# Patient Record
Sex: FEMALE | Race: WHITE | URBAN - METROPOLITAN AREA
[De-identification: names, ages, dates, MRNs, and addresses within clinical notes are randomized per-mention and may not be internally consistent; named-entity substitution may affect disease eponyms.]

---

## 2022-03-14 ENCOUNTER — OFFICE VISIT (OUTPATIENT)
Dept: URBAN - METROPOLITAN AREA CLINIC 63 | Facility: CLINIC | Age: 37
End: 2022-03-14

## 2022-05-13 ENCOUNTER — OFFICE VISIT (OUTPATIENT)
Dept: URBAN - METROPOLITAN AREA SURGERY CENTER 4 | Facility: SURGERY CENTER | Age: 37
End: 2022-05-13

## 2022-05-18 LAB — PATHOLOGY (INDENTED REPORT): (no result)

## 2022-05-26 ENCOUNTER — OFFICE VISIT (OUTPATIENT)
Dept: URBAN - METROPOLITAN AREA CLINIC 63 | Facility: CLINIC | Age: 37
End: 2022-05-26

## 2022-06-15 ENCOUNTER — LAB OUTSIDE AN ENCOUNTER (OUTPATIENT)
Dept: URBAN - METROPOLITAN AREA CLINIC 121 | Facility: CLINIC | Age: 37
End: 2022-06-15

## 2022-06-22 LAB
% SATURATION: (no result)
ABSOLUTE BASOPHILS: (no result)
ABSOLUTE EOSINOPHILS: (no result)
ABSOLUTE LYMPHOCYTES: (no result)
ABSOLUTE MONOCYTES: (no result)
ABSOLUTE NEUTROPHILS: (no result)
ALBUMIN/GLOBULIN RATIO: (no result)
ALBUMIN: (no result)
ALKALINE PHOSPHATASE: (no result)
ALT: (no result)
AST: (no result)
BASOPHILS: (no result)
BILIRUBIN, TOTAL: (no result)
BUN/CREATININE RATIO: (no result)
C-REACTIVE PROTEIN: (no result)
CALCIUM: (no result)
CARBON DIOXIDE: (no result)
CHLORIDE: (no result)
CREATININE: (no result)
EGFR AFRICAN AMERIC: (no result)
EGFR NON-AFR. AMERI: (no result)
EOSINOPHILS: (no result)
FERRITIN: (no result)
GLOBULIN: (no result)
GLUCOSE: (no result)
HEMATOCRIT: (no result)
HEMOGLOBIN: (no result)
HEPATITIS B CORE AB TOTAL (REFL): (no result)
HEPATITIS B SURFACE ANTIGEN: (no result)
HEPATITIS C ANTIBODY: (no result)
INDEX: 0.02
IRON BINDING CAPACITY: (no result)
IRON, TOTAL: (no result)
LYMPHOCYTES: (no result)
MCH: (no result)
MCHC: (no result)
MCV: (no result)
MITOGEN-NIL: (no result)
MONOCYTES: (no result)
MPV: (no result)
NEUTROPHILS: (no result)
NIL: (no result)
PLATELET COUNT: (no result)
POTASSIUM: (no result)
PROTEIN, TOTAL: (no result)
QUANTIFERON(R)-TB GOLD PLUS, 1 TUBE: NEGATIVE
RDW: (no result)
RED BLOOD CELL COUNT: (no result)
REFLEX TIQ: (no result)
SODIUM: (no result)
TB1-NIL: (no result)
TB2-NIL: (no result)
UREA NITROGEN (BUN): (no result)
WHITE BLOOD CELL COUNT: (no result)

## 2022-07-07 ENCOUNTER — OFFICE VISIT (OUTPATIENT)
Dept: URBAN - METROPOLITAN AREA CLINIC 63 | Facility: CLINIC | Age: 37
End: 2022-07-07

## 2022-07-07 RX ORDER — PREDNISONE 20 MG/1
2 TABLET TABLET ORAL ONCE A DAY
Status: ACTIVE | COMMUNITY

## 2022-07-07 RX ORDER — DICYCLOMINE HYDROCHLORIDE 20 MG/1
1 TABLET TABLET ORAL THREE TIMES A DAY
Status: ACTIVE | COMMUNITY

## 2022-07-07 RX ORDER — OMEPRAZOLE 40 MG/1
1 CAPSULE 30 MINUTES BEFORE MORNING MEAL CAPSULE, DELAYED RELEASE ORAL ONCE A DAY
Status: ACTIVE | COMMUNITY

## 2022-07-07 NOTE — HPI-TODAY'S VISIT:
36-year-old female with asthma initially presented to the office in March 2022 after having been seen in the ER at [facility] with epigastric pain, low back pain, and intermittent sharp right lower quadrant pain.  CT scan of the abdomen/pelvis was done and showed moderate wall thickening in the distal and terminal ileum possibly representing Crohn's disease versus enteritis.  Her CBC and CMP were unremarkable.  She reported a longstanding history of chronic constipation and would notice small amounts of blood on the toilet tissue which she attributed to hemorrhoids.  She began having diarrhea in early March and was passing 1 to 2 tablespoons of bright red blood in the stool.  She was using NSAIDs 2-3 times a day over the previous 5 to 6 years.  She was advised to avoid NSAID use.  She complained of epigastric discomfort with associated nausea and was prescribed famotidine 40 mg daily and dicyclomine to use as needed.  EGD and colonoscopy were done on May 13, 2022.  Her EGD demonstrated severely erythematous mucosa with erosions in the gastric antrum and in the prepyloric region of the stomach, also at the pylorus.  Several small shallow gastric ulcers were found in the gastric antrum and in the prepyloric region of the stomach.  Gastric biopsies were negative for H. pylori.  Mildly congested mucosa without active bleeding was found in the first portion of the duodenum.  Duodenal biopsy showed no evidence for celiac disease.  Her colonoscopy demonstrated a diffuse area of the terminal ileum with congestion and edema.  The scope was advanced at least 15 cm into the terminal ileum and there was diffuse inflammation.  Multiple shallow ulcers with the endoscopic appearance concerning for Crohn's disease was observed.  The biopsy demonstrated small bowel mucosa with chronic enteritis, polypoid change and ulcer bed material.  The colonic mucosa was normal throughout.  Random right and left colon biopsy showed mild chronic colitis.  No cryptitis was noted.  She was started on omeprazole 40 mg twice daily following her procedures.  She reported that she had completely stopped using NSAIDs.  CT enterography was done on Korina 3, 2022.  This demonstrated mild active inflammation involving portions of the distal and terminal ileum without evidence of complication.  Labs were done on Korina 15, 2022.  Her CBC, CMP and CRP were unremarkable.  Serum iron was normal.  Ferritin was low at 11.  Orders were placed to start Stelara and she is scheduled for her induction infusion.

## 2022-07-09 ENCOUNTER — TELEPHONE ENCOUNTER (OUTPATIENT)
Dept: URBAN - METROPOLITAN AREA CLINIC 121 | Facility: CLINIC | Age: 37
End: 2022-07-09

## 2022-07-09 RX ORDER — DICYCLOMINE HYDROCHLORIDE 20 MG/1
FOUR TIMES A DAY AS NEEDED TABLET ORAL
Refills: 0 | OUTPATIENT
Start: 2022-03-14 | End: 2022-05-13

## 2022-07-09 RX ORDER — FAMOTIDINE 40 MG/1
ONCE A DAY TABLET, FILM COATED ORAL ONCE A DAY
Refills: 2 | OUTPATIENT
Start: 2022-03-14 | End: 2022-05-13

## 2022-07-09 RX ORDER — PREDNISONE 20 MG/1
Q 12 HOURS TABLET ORAL TAKE AS DIRECTED
Refills: 0 | OUTPATIENT
Start: 2022-03-14 | End: 2022-05-26

## 2022-07-09 RX ORDER — DICYCLOMINE HYDROCHLORIDE 20 MG/1
TABLET ORAL ONCE A DAY
Refills: 0 | OUTPATIENT
Start: 2022-03-14 | End: 2022-05-26

## 2022-07-09 RX ORDER — OMEPRAZOLE 40 MG/1
TWICE A DAY CAPSULE, DELAYED RELEASE ORAL TWICE A DAY
Refills: 2 | OUTPATIENT
Start: 2022-05-13 | End: 2022-06-06

## 2022-07-09 RX ORDER — DICYCLOMINE HYDROCHLORIDE 20 MG/1
TAKE 1 TABLET BY MOUTH FOUR TIMES A DAY AS NEEDED TABLET ORAL
Refills: 0 | OUTPATIENT
Start: 2022-05-13 | End: 2022-05-16

## 2022-07-10 ENCOUNTER — TELEPHONE ENCOUNTER (OUTPATIENT)
Dept: URBAN - METROPOLITAN AREA CLINIC 121 | Facility: CLINIC | Age: 37
End: 2022-07-10

## 2022-07-10 RX ORDER — DICYCLOMINE HYDROCHLORIDE 20 MG/1
TAKE 1 TABLET BY MOUTH FOUR TIMES A DAY AS NEEDED TABLET ORAL
Refills: 1 | Status: ACTIVE | COMMUNITY
Start: 2022-05-16

## 2022-07-10 RX ORDER — PREDNISONE 20 MG/1
Q 12 HOURS TABLET ORAL TAKE AS DIRECTED
Refills: 0 | Status: ACTIVE | COMMUNITY
Start: 2022-05-26

## 2022-07-10 RX ORDER — FAMOTIDINE 40 MG/1
TAKE 1 TABLET BY MOUTH EVERY DAY TABLET, FILM COATED ORAL
Refills: 2 | Status: ACTIVE | COMMUNITY
Start: 2022-05-13

## 2022-07-10 RX ORDER — POLYETHYLENE GLYCOL-3350 AND ELECTROLYTES WITH FLAVOR PACK 240; 5.84; 2.98; 6.72; 22.72 G/278.26G; G/278.26G; G/278.26G; G/278.26G; G/278.26G
TAKE AS DIRECTED POWDER, FOR SOLUTION ORAL TAKE AS DIRECTED
Refills: 0 | Status: ACTIVE | COMMUNITY
Start: 2022-03-14

## 2022-07-10 RX ORDER — OMEPRAZOLE 40 MG/1
TWICE A DAY CAPSULE, DELAYED RELEASE ORAL TWICE A DAY
Refills: 2 | Status: ACTIVE | COMMUNITY
Start: 2022-06-06

## 2022-07-28 ENCOUNTER — OFFICE VISIT (OUTPATIENT)
Dept: URBAN - METROPOLITAN AREA CLINIC 63 | Facility: CLINIC | Age: 37
End: 2022-07-28
Payer: COMMERCIAL

## 2022-07-28 ENCOUNTER — TELEPHONE ENCOUNTER (OUTPATIENT)
Dept: URBAN - METROPOLITAN AREA CLINIC 63 | Facility: CLINIC | Age: 37
End: 2022-07-28

## 2022-07-28 ENCOUNTER — OFFICE VISIT (OUTPATIENT)
Dept: URBAN - METROPOLITAN AREA CLINIC 63 | Facility: CLINIC | Age: 37
End: 2022-07-28

## 2022-07-28 ENCOUNTER — WEB ENCOUNTER (OUTPATIENT)
Dept: URBAN - METROPOLITAN AREA CLINIC 63 | Facility: CLINIC | Age: 37
End: 2022-07-28

## 2022-07-28 VITALS
HEIGHT: 62 IN | DIASTOLIC BLOOD PRESSURE: 79 MMHG | TEMPERATURE: 98.5 F | SYSTOLIC BLOOD PRESSURE: 123 MMHG | BODY MASS INDEX: 38.68 KG/M2 | OXYGEN SATURATION: 99 % | WEIGHT: 210.2 LBS | HEART RATE: 99 BPM

## 2022-07-28 DIAGNOSIS — K50.00 CROHN'S DISEASE OF SMALL INTESTINE WITHOUT COMPLICATION: ICD-10-CM

## 2022-07-28 DIAGNOSIS — K27.9 PUD (PEPTIC ULCER DISEASE): ICD-10-CM

## 2022-07-28 PROBLEM — 13200003: Status: ACTIVE | Noted: 2022-07-28

## 2022-07-28 PROCEDURE — 99213 OFFICE O/P EST LOW 20 MIN: CPT | Performed by: PHYSICIAN ASSISTANT

## 2022-07-28 RX ORDER — POLYETHYLENE GLYCOL-3350 AND ELECTROLYTES WITH FLAVOR PACK 240; 5.84; 2.98; 6.72; 22.72 G/278.26G; G/278.26G; G/278.26G; G/278.26G; G/278.26G
TAKE AS DIRECTED POWDER, FOR SOLUTION ORAL TAKE AS DIRECTED
Refills: 0 | Status: ACTIVE | COMMUNITY
Start: 2022-03-14

## 2022-07-28 RX ORDER — DICYCLOMINE HYDROCHLORIDE 20 MG/1
1 TABLET TABLET ORAL THREE TIMES A DAY
Status: ACTIVE | COMMUNITY

## 2022-07-28 RX ORDER — OMEPRAZOLE 40 MG/1
TWICE A DAY CAPSULE, DELAYED RELEASE ORAL TWICE A DAY
Refills: 2 | Status: ACTIVE | COMMUNITY
Start: 2022-06-06

## 2022-07-28 RX ORDER — FAMOTIDINE 40 MG/1
TAKE 1 TABLET BY MOUTH EVERY DAY TABLET, FILM COATED ORAL
Refills: 2 | Status: ACTIVE | COMMUNITY
Start: 2022-05-13

## 2022-07-28 RX ORDER — PREDNISONE 20 MG/1
Q 12 HOURS TABLET ORAL TAKE AS DIRECTED
Refills: 0 | Status: ACTIVE | COMMUNITY
Start: 2022-05-26

## 2022-07-28 RX ORDER — PREDNISONE 20 MG/1
2 TABLET TABLET ORAL ONCE A DAY
Status: ACTIVE | COMMUNITY

## 2022-07-28 RX ORDER — DICYCLOMINE HYDROCHLORIDE 20 MG/1
TAKE 1 TABLET BY MOUTH FOUR TIMES A DAY AS NEEDED TABLET ORAL
Refills: 1 | Status: ACTIVE | COMMUNITY
Start: 2022-05-16

## 2022-07-28 RX ORDER — OMEPRAZOLE 40 MG/1
1 CAPSULE 30 MINUTES BEFORE MORNING MEAL CAPSULE, DELAYED RELEASE ORAL ONCE A DAY
Status: ACTIVE | COMMUNITY

## 2022-07-28 NOTE — HPI-TODAY'S VISIT:
36-year-old female was initially seen in the office in March 2022 after having been seen in the ER at [facility] with abdominal pain.  CT scan of the abdomen and pelvis was done which showed moderate wall thickening in the distal and terminal ileum possibly representing Crohn's versus enteritis.  Her CBC and CMP were unremarkable. At her initial office visit in March she reported chronic intermittent upper abdominal pain and nausea.  She reported a longstanding history of chronic constipation and would notice small amounts of blood on the toilet tissue which she attributed to hemorrhoids.  Before she was seen in the ER she began to have diarrhea and was passing 1 to 2 tablespoons of bright red blood in the stool.  She also reported that she had been using NSAIDs 2-3 times a day every day for the past 5 or 6 years. She was prescribed famotidine and dicyclomine.  She was advised to stop using NSAIDs.  EGD and colonoscopy were done on May 13, 2022.  Her EGD demonstrated severely erythematous mucosa with erosions in the gastric antrum, prepyloric region of the stomach and at the pylorus.  Several small shallow gastric ulcers were found in the gastric antrum and in the prepyloric region of the stomach.  Gastric biopsies were negative for H. pylori.  She was found to have mild duodenitis in the first portion of the duodenum.  The second portion of the duodenum was normal and the biopsy showed no evidence for celiac disease.  Her colonoscopy demonstrated a diffuse area of the terminal ileum with congestion and edema.  The scope was advanced at least 15 cm into the terminal ileum and there was diffuse inflammation.  The terminal ileum contained multiple shallow ulcers with endoscopic appearance concerning for Crohn's disease.  The biopsy of the terminal ileum showed small bowel mucosa with chronic enteritis, polypoid change in ulcer bed material.  The colonic mucosa appeared normal throughout.  Random right and left colon biopsy showed mild chronic colitis.  Grade 1 internal hemorrhoids were observed. Following EGD her famotidine was changed to omeprazole 40 mg twice daily.  She discontinued using NSAIDs.  She was last seen in the office on May 26 still reporting diarrhea though she had not noticed any further blood in the stool.  Stool studies were ordered at her office visit in March which she did not complete.  Her stool studies were reordered at her office visit in May but she has not completed them.  She was sent for a CT enterography which was done on Korina 3, 2022 and showed mild active inflammation involving portions of the distal and terminal ileum without evidence of complication.  She follows up today after receiving her induction infusion of Stelara this morning. She is having diarrhea 4-5 times per day. She states she completed her stool studies last month but the results have not been received by our office. She has abdominal cramping in the mid abdomen several times per day. She is using APAP as needed. She also uses a PMS pain reliever during her menses. She continues to use omeprazole 40mg BID and famotidine once daily. She is eating well.  She has used dicyclomine in the past but does not like to use it because it causes drowsiness.

## 2022-08-09 ENCOUNTER — ERX REFILL RESPONSE (OUTPATIENT)
Dept: URBAN - METROPOLITAN AREA CLINIC 63 | Facility: CLINIC | Age: 37
End: 2022-08-09

## 2022-08-09 ENCOUNTER — TELEPHONE ENCOUNTER (OUTPATIENT)
Dept: URBAN - METROPOLITAN AREA CLINIC 63 | Facility: CLINIC | Age: 37
End: 2022-08-09

## 2022-08-09 RX ORDER — FAMOTIDINE 40 MG/1
TAKE 1 TABLET BY MOUTH EVERY DAY TABLET, FILM COATED ORAL
Refills: 2 | OUTPATIENT

## 2022-08-09 RX ORDER — FAMOTIDINE 40 MG/1
TAKE 1 TABLET BY MOUTH EVERY DAY TABLET, FILM COATED ORAL
Qty: 90 TABLET | Refills: 0 | OUTPATIENT

## 2022-08-11 ENCOUNTER — TELEPHONE ENCOUNTER (OUTPATIENT)
Dept: URBAN - METROPOLITAN AREA CLINIC 63 | Facility: CLINIC | Age: 37
End: 2022-08-11

## 2022-08-11 PROBLEM — 34000006: Status: ACTIVE | Noted: 2022-08-11

## 2022-08-11 RX ORDER — USTEKINUMAB 90 MG/ML
AS DIRECTED INJECTION, SOLUTION SUBCUTANEOUS
Qty: 1 | Refills: 8 | OUTPATIENT
Start: 2022-08-11 | End: 2023-12-27

## 2022-08-22 LAB
A/G RATIO: 1.6
ALBUMIN: 4.2
ALKALINE PHOSPHATASE: 56
ALT (SGPT): 11
AST (SGOT): 11
BILIRUBIN, TOTAL: 0.4
BUN/CREATININE RATIO: (no result)
BUN: 12
C-REACTIVE PROTEIN, QUANT: 2.3
CALCIUM: 9.3
CARBON DIOXIDE, TOTAL: 25
CHLORIDE: 108
CREATININE: 0.65
EGFR: 117
GLOBULIN, TOTAL: 2.6
GLUCOSE: 93
HEMATOCRIT: 39.6
HEMOGLOBIN: 13.6
MCH: 31.6
MCHC: 34.3
MCV: 91.9
MPV: 8.7
PLATELET COUNT: 369
POTASSIUM: 4.7
PROTEIN, TOTAL: 6.8
RDW: 12.6
RED BLOOD CELL COUNT: 4.31
SODIUM: 140
WHITE BLOOD CELL COUNT: 6.9

## 2022-08-25 ENCOUNTER — TELEPHONE ENCOUNTER (OUTPATIENT)
Dept: URBAN - METROPOLITAN AREA CLINIC 63 | Facility: CLINIC | Age: 37
End: 2022-08-25

## 2022-08-25 RX ORDER — USTEKINUMAB 90 MG/ML
AS DIRECTED INJECTION, SOLUTION SUBCUTANEOUS
Qty: 1 | Refills: 8 | OUTPATIENT
Start: 2022-08-25 | End: 2024-01-10

## 2022-09-14 ENCOUNTER — LAB OUTSIDE AN ENCOUNTER (OUTPATIENT)
Dept: URBAN - METROPOLITAN AREA CLINIC 63 | Facility: CLINIC | Age: 37
End: 2022-09-14

## 2022-10-03 ENCOUNTER — OFFICE VISIT (OUTPATIENT)
Dept: URBAN - METROPOLITAN AREA CLINIC 63 | Facility: CLINIC | Age: 37
End: 2022-10-03

## 2022-11-08 ENCOUNTER — OFFICE VISIT (OUTPATIENT)
Dept: URBAN - METROPOLITAN AREA CLINIC 63 | Facility: CLINIC | Age: 37
End: 2022-11-08
Payer: COMMERCIAL

## 2022-11-08 ENCOUNTER — WEB ENCOUNTER (OUTPATIENT)
Dept: URBAN - METROPOLITAN AREA CLINIC 63 | Facility: CLINIC | Age: 37
End: 2022-11-08

## 2022-11-08 ENCOUNTER — LAB OUTSIDE AN ENCOUNTER (OUTPATIENT)
Dept: URBAN - METROPOLITAN AREA CLINIC 63 | Facility: CLINIC | Age: 37
End: 2022-11-08

## 2022-11-08 VITALS
SYSTOLIC BLOOD PRESSURE: 120 MMHG | HEART RATE: 80 BPM | BODY MASS INDEX: 36.62 KG/M2 | TEMPERATURE: 98.1 F | DIASTOLIC BLOOD PRESSURE: 80 MMHG | WEIGHT: 199 LBS | OXYGEN SATURATION: 99 % | HEIGHT: 62 IN

## 2022-11-08 DIAGNOSIS — K50.00 CROHN'S DISEASE OF SMALL INTESTINE WITHOUT COMPLICATION: ICD-10-CM

## 2022-11-08 DIAGNOSIS — K59.00 CONSTIPATION, UNSPECIFIED CONSTIPATION TYPE: ICD-10-CM

## 2022-11-08 PROCEDURE — 99213 OFFICE O/P EST LOW 20 MIN: CPT | Performed by: PHYSICIAN ASSISTANT

## 2022-11-08 RX ORDER — USTEKINUMAB 90 MG/ML
AS DIRECTED INJECTION, SOLUTION SUBCUTANEOUS
Qty: 1 | Refills: 8 | Status: ACTIVE | COMMUNITY
Start: 2022-08-25 | End: 2024-01-10

## 2022-11-08 NOTE — HPI-TODAY'S VISIT:
36-year-old female with Crohn's disease presents for follow-up.  She received her  induction dose of Stelara in July and her first maintenance dose on September 22, 2022.  She is due for her next maintenance dose next week. She was initially seen in the office in March 2022 after having been seen in the ER at [facility] with abdominal pain.  CT scan of the abdomen and pelvis was done which showed moderate wall thickening in the distal and terminal ileum possibly resenting Crohn's disease versus enteritis.  Her CBC and CMP were unremarkable.  At her initial office visit in March, she reported chronic intermittent upper abdominal pain and nausea.  She reported a longstanding history of chronic constipation and would notice small amounts of blood on the toilet tissue.  Before she was seen in the ER she began to have diarrhea and was passing 1 to 2 tablespoons of bright red blood in the stool.  She had also reported that she had been using NSAIDs 2-3 times per day, every day for 5 or 6 years.  She was advised to stop using NSAIDs.  She was prescribed famotidine and dicyclomine.  EGD and colonoscopy were done on May 13, 2022.  Her EGD demonstrated severely erythematous mucosa with erosions in the gastric antrum, prepyloric region of the stomach and at the pylorus.  Several small shallow gastric ulcers were found in the gastric antrum and in the prepyloric region of the stomach.  Gastric biopsies were negative for H. pylori.  She was found to have mild duodenitis in the first portion of the duodenum.  The second portion of the duodenum was normal and the biopsy showed no evidence for celiac disease.  Her colonoscopy demonstrated a diffuse area of terminal ileum with congestion and edema.  The scope was advanced at least 15 cm into the terminal ileum and there was diffuse inflammation.  The terminal ileum contained multiple shallow ulcers with endoscopic appearance concerning for Crohn's disease.  Biopsy of the terminal ileum showed small bowel mucosa with chronic enteritis, polypoid change in ulcer bed material.  The colonic mucosa appeared normal throughout.  Random right and left colon biopsies showed mild chronic colitis.  Grade 1 internal hemorrhoids were observed. Following her EGD she was started on omeprazole 40 mg twice daily.  She had discontinued using NSAIDs.  CT enterography was done on Korina 3, 2022 and showed mild active inflammation involving portions of the distal and terminal ileum without evidence of complication. She was last seen in the office in July after her induction dose of Stelara which had just been given earlier that morning.  She reported having diarrhea 4-5 times per day.  She reported abdominal cramping which would occur several times a day.  She denied any NSAID use.  She had used dicyclomine in the past but it causes drowsiness so she prefers not to use this. Labs were done on August 19, 2022.  Her CBC, CMP and CRP were normal.  She follows up today reporting intermittent abdominal pain. States she has body aches and sweats but no fever. She feels dehydrated. She states her bowel habits have been alternating between constipation and normal stools. She has not had a BM in one week. She had diarrhea a couple weeks ago but she is not having frequent diarrhea. She states she has occasional "tinge" of blood in the stools. States she felt nauseous after her last injection of Stelara.

## 2022-11-22 ENCOUNTER — CLAIMS CREATED FROM THE CLAIM WINDOW (OUTPATIENT)
Dept: URBAN - METROPOLITAN AREA SURGERY CENTER 4 | Facility: SURGERY CENTER | Age: 37
End: 2022-11-22
Payer: COMMERCIAL

## 2022-11-22 ENCOUNTER — CLAIMS CREATED FROM THE CLAIM WINDOW (OUTPATIENT)
Dept: URBAN - METROPOLITAN AREA CLINIC 4 | Facility: CLINIC | Age: 37
End: 2022-11-22
Payer: COMMERCIAL

## 2022-11-22 DIAGNOSIS — D12.0 POLYP OF CECUM: ICD-10-CM

## 2022-11-22 DIAGNOSIS — K64.0 GRADE I INTERNAL HEMORRHOIDS: ICD-10-CM

## 2022-11-22 DIAGNOSIS — K52.9 ILEITIS: ICD-10-CM

## 2022-11-22 DIAGNOSIS — K59.00 CONSTIPATION, UNSPECIFIED CONSTIPATION TYPE: ICD-10-CM

## 2022-11-22 DIAGNOSIS — K50.00 CROHN'S DISEASE OF SMALL INTESTINE WITHOUT COMPLICATIONS: ICD-10-CM

## 2022-11-22 PROCEDURE — 45380 COLONOSCOPY AND BIOPSY: CPT | Performed by: INTERNAL MEDICINE

## 2022-11-22 PROCEDURE — 45385 COLONOSCOPY W/LESION REMOVAL: CPT | Performed by: INTERNAL MEDICINE

## 2022-11-22 PROCEDURE — 88305 TISSUE EXAM BY PATHOLOGIST: CPT | Performed by: PATHOLOGY

## 2022-11-22 RX ORDER — USTEKINUMAB 90 MG/ML
AS DIRECTED INJECTION, SOLUTION SUBCUTANEOUS
Qty: 1 | Refills: 8 | Status: ACTIVE | COMMUNITY
Start: 2022-08-25 | End: 2024-01-10

## 2022-11-30 PROBLEM — 14760008: Status: ACTIVE | Noted: 2022-11-08

## 2023-01-03 ENCOUNTER — TELEPHONE ENCOUNTER (OUTPATIENT)
Dept: URBAN - METROPOLITAN AREA CLINIC 63 | Facility: CLINIC | Age: 38
End: 2023-01-03

## 2023-01-03 RX ORDER — FAMOTIDINE 40 MG/1
TAKE 1 TABLET BY MOUTH EVERY DAY TABLET, FILM COATED ORAL
Qty: 90 TABLET | Refills: 0

## 2023-01-05 ENCOUNTER — OFFICE VISIT (OUTPATIENT)
Dept: URBAN - METROPOLITAN AREA CLINIC 63 | Facility: CLINIC | Age: 38
End: 2023-01-05
Payer: COMMERCIAL

## 2023-01-05 VITALS
WEIGHT: 193 LBS | HEIGHT: 62 IN | BODY MASS INDEX: 35.51 KG/M2 | TEMPERATURE: 98.6 F | DIASTOLIC BLOOD PRESSURE: 68 MMHG | SYSTOLIC BLOOD PRESSURE: 116 MMHG | OXYGEN SATURATION: 98 % | HEART RATE: 77 BPM

## 2023-01-05 DIAGNOSIS — K50.00 CROHN'S DISEASE OF SMALL INTESTINE WITHOUT COMPLICATION: ICD-10-CM

## 2023-01-05 PROBLEM — 56689002: Status: ACTIVE | Noted: 2022-07-07

## 2023-01-05 PROCEDURE — 99214 OFFICE O/P EST MOD 30 MIN: CPT | Performed by: PHYSICIAN ASSISTANT

## 2023-01-05 RX ORDER — USTEKINUMAB 90 MG/ML
AS DIRECTED INJECTION, SOLUTION SUBCUTANEOUS
Qty: 1 | Refills: 8 | Status: ACTIVE | COMMUNITY
Start: 2022-08-25 | End: 2024-01-10

## 2023-01-05 RX ORDER — FAMOTIDINE 40 MG/1
TAKE 1 TABLET BY MOUTH EVERY DAY TABLET, FILM COATED ORAL
Qty: 90 TABLET | Refills: 0 | Status: ACTIVE | COMMUNITY

## 2023-01-05 NOTE — HPI-TODAY'S VISIT:
37-year-old female with history of Crohn's disease diagnosed on colonoscopy in May 2022 currently on Stelara presents for follow-up.  She has a history of PUD on EGD in May secondary to chronic NSAID use.  She was advised to avoid NSAID pain relievers.  She was treated with PPI twice daily.  She received her first induction dose of Stelara in July 2022.  Labs done August 9, 2022 including CBC, CMP and CRP were normal.  CT enterography in June 2022 showed mild active inflammation involving portions of the distal and terminal ileum without evidence of complication. She reported chronic intermittent abdominal pain.  She reported having body aches and sweats but no fever.  Bowel habits alternate between constipation and normal stools.  She reported having occasional diarrhea though this was not frequent.  She reported seeing an occasional tiny amount of blood in the stool. She follows up today after colonoscopy on November 22, 2022 which was done to evaluate her response to Stelara.  Her colonoscopy showed improvement with mild to moderate inflammation in the terminal ileum.  This was improved from her prior colonoscopy in May.  The ulcerations appeared to be healed and improved.  Biopsy of the terminal ileum showed chronic active ileitis with aphthous ulcer and pyloric gland metaplasia consistent with Crohn's disease.  The colonic mucosa appeared normal throughout.  A 5 mm inflammatory pseudopolyp was removed from the cecum.  Small internal hemorrhoids were observed.  She follows up doing well. She has alternating constipation and diarrhea. States at this point, she usually has one BM per day. Not having any frequent diarrhea.  She has chronic abdominal discomfort and uses dicyclomine as needed. She has no blood in the stool. No other complaints.

## 2023-03-02 ENCOUNTER — TELEPHONE ENCOUNTER (OUTPATIENT)
Dept: URBAN - METROPOLITAN AREA CLINIC 63 | Facility: CLINIC | Age: 38
End: 2023-03-02

## 2023-03-07 ENCOUNTER — WEB ENCOUNTER (OUTPATIENT)
Dept: URBAN - METROPOLITAN AREA CLINIC 63 | Facility: CLINIC | Age: 38
End: 2023-03-07

## 2023-03-08 LAB
A/G RATIO: 1.8
ALBUMIN: 3.9
ALKALINE PHOSPHATASE: 50
ALT (SGPT): 15
AST (SGOT): 16
BILIRUBIN, TOTAL: 0.3
BUN/CREATININE RATIO: (no result)
BUN: 9
C-REACTIVE PROTEIN, QUANT: 1.8
CALCIUM: 9
CARBON DIOXIDE, TOTAL: 24
CHLORIDE: 109
CREATININE: 0.58
EGFR: 119
GLOBULIN, TOTAL: 2.2
GLUCOSE: 84
HEMATOCRIT: 41
HEMOGLOBIN: 14.1
MCH: 32.6
MCHC: 34.4
MCV: 94.7
MPV: 8.8
PLATELET COUNT: 327
POTASSIUM: 4.6
PROTEIN, TOTAL: 6.1
RDW: 12.2
RED BLOOD CELL COUNT: 4.33
SODIUM: 140
WHITE BLOOD CELL COUNT: 6.5

## 2023-03-13 ENCOUNTER — OFFICE VISIT (OUTPATIENT)
Dept: URBAN - METROPOLITAN AREA CLINIC 63 | Facility: CLINIC | Age: 38
End: 2023-03-13
Payer: COMMERCIAL

## 2023-03-13 ENCOUNTER — LAB OUTSIDE AN ENCOUNTER (OUTPATIENT)
Dept: URBAN - METROPOLITAN AREA CLINIC 63 | Facility: CLINIC | Age: 38
End: 2023-03-13

## 2023-03-13 ENCOUNTER — WEB ENCOUNTER (OUTPATIENT)
Dept: URBAN - METROPOLITAN AREA CLINIC 63 | Facility: CLINIC | Age: 38
End: 2023-03-13

## 2023-03-13 VITALS
OXYGEN SATURATION: 98 % | DIASTOLIC BLOOD PRESSURE: 78 MMHG | HEIGHT: 62 IN | WEIGHT: 199 LBS | TEMPERATURE: 97.8 F | SYSTOLIC BLOOD PRESSURE: 120 MMHG | BODY MASS INDEX: 36.62 KG/M2 | HEART RATE: 82 BPM

## 2023-03-13 DIAGNOSIS — R10.30 LOWER ABDOMINAL PAIN: ICD-10-CM

## 2023-03-13 DIAGNOSIS — R10.13 EPIGASTRIC PAIN: ICD-10-CM

## 2023-03-13 DIAGNOSIS — R19.7 DIARRHEA, UNSPECIFIED TYPE: ICD-10-CM

## 2023-03-13 PROCEDURE — 99214 OFFICE O/P EST MOD 30 MIN: CPT | Performed by: PHYSICIAN ASSISTANT

## 2023-03-13 RX ORDER — DICYCLOMINE HYDROCHLORIDE 20 MG/1
TAKE 1 TABLET BY MOUTH FOUR TIMES A DAY AS NEEDED TABLET ORAL THREE TIMES A DAY
Qty: 90 | Refills: 3
End: 2023-06-11

## 2023-03-13 RX ORDER — DICYCLOMINE HYDROCHLORIDE 20 MG/1
TAKE 1 TABLET BY MOUTH FOUR TIMES A DAY AS NEEDED TABLET ORAL
Qty: 360 EACH | Refills: 0 | Status: ACTIVE | COMMUNITY

## 2023-03-13 RX ORDER — FAMOTIDINE 40 MG/1
TAKE 1 TABLET BY MOUTH EVERY DAY TABLET, FILM COATED ORAL
Qty: 90 TABLET | Refills: 0 | Status: ACTIVE | COMMUNITY

## 2023-03-13 RX ORDER — USTEKINUMAB 90 MG/ML
INJECTION, SOLUTION SUBCUTANEOUS
Qty: 1 | Refills: 0 | Status: ACTIVE | COMMUNITY

## 2023-03-13 RX ORDER — OMEPRAZOLE 40 MG/1
TAKE 1 CAPSULE BY MOUTH TWICE A DAY CAPSULE, DELAYED RELEASE ORAL ONCE A DAY
Qty: 30 | Refills: 3

## 2023-03-13 RX ORDER — OMEPRAZOLE 40 MG/1
TAKE 1 CAPSULE BY MOUTH TWICE A DAY CAPSULE, DELAYED RELEASE ORAL
Qty: 60 EACH | Refills: 0 | Status: ACTIVE | COMMUNITY

## 2023-03-13 NOTE — HPI-TODAY'S VISIT:
37-year-old female with history of Crohn's disease diagnosed on colonoscopy in May 2022 currently on Stelara 90 mg subcu every 8 weeks presents for follow-up.  She has a history of PUD on EGD 2020 secondary to chronic NSAID use and she was advised to avoid NSAID pain relievers.  She was treated with high-dose PPI.  She received her first induction dose of Stelara in July 2022.  Her labs done in August 2022 including CBC, CMP and CRP were normal.  CT enterography in June 2022 showed mild active inflammation involving portions of the distal and terminal ileum without evidence of complication.  She continues to report chronic intermittent abdominal pain she reported having body aches and sweats without fever.  Her bowel habits usually alternate between constipation and diarrhea the diarrhea does not occur frequently.  She has occasionally seen a tiny amount of blood in the stool.  Repeat colonoscopy was done November 22, 2022 to evaluate her response to Stelara.  Her colonoscopy showed improvement with mild to moderate inflammation in the terminal ileum.  This was improved from her prior colonoscopy in May 2022.  The ulcerations appear to be healed and improved.  Biopsy of the terminal ileum showed chronic active ileitis with aphthous ulcer and pyloric gland metaplasia consistent with Crohn's disease.  The colonic mucosa appeared normal throughout.  A 5 mm inflammatory pseudopolyp was removed from the cecum.  Small internal hemorrhoids were observed.  She was last seen in the office in January 2023.  She continues to report alternating bowel habits between constipation and diarrhea usually she was having 1 bowel movement per day and was not having any frequent diarrhea.  She reported chronic abdominal discomfort and was using dicyclomine as needed.  She denied any blood in the stool.  She was advised to follow-up in 6 months after repeat labs.  She phoned into the office earlier this month reporting with mucus and dark stools.  She also reported abdominal cramping.  Labs were done including CBC, CMP, and CRP on March 7, 2023 which were all normal.  She presents today still having stools with mucus and black specks. She has a lot of fatigue. She feels hot and sweaty while she is at work. She works outside..She has pain in the epigastric pain over the past couple of weeks. She has had 3 episodes of diarrhea per day over the past 2 weeks. She was given antibiotics in Dec for a URI. No rectal bleeding. She is not using NSAIDs.

## 2023-03-14 ENCOUNTER — TELEPHONE ENCOUNTER (OUTPATIENT)
Dept: URBAN - METROPOLITAN AREA CLINIC 61 | Facility: CLINIC | Age: 38
End: 2023-03-14

## 2023-04-03 LAB
CALPROTECTIN, FECAL: 127
CAMPYLOBACTER SPP. AG,EIA: (no result)
CLOSTRIDIUM DIFFICILE: (no result)
SALMONELLA AND SHIGELLA, CULTURE: (no result)
SHIGA TOXINS, EIA W/RFL TO E.COLI O157 CULTURE: (no result)

## 2023-04-04 ENCOUNTER — CLAIMS CREATED FROM THE CLAIM WINDOW (OUTPATIENT)
Dept: URBAN - METROPOLITAN AREA SURGERY CENTER 4 | Facility: SURGERY CENTER | Age: 38
End: 2023-04-04
Payer: COMMERCIAL

## 2023-04-04 ENCOUNTER — CLAIMS CREATED FROM THE CLAIM WINDOW (OUTPATIENT)
Dept: URBAN - METROPOLITAN AREA CLINIC 4 | Facility: CLINIC | Age: 38
End: 2023-04-04
Payer: COMMERCIAL

## 2023-04-04 DIAGNOSIS — K44.9 HIATAL HERNIA: ICD-10-CM

## 2023-04-04 DIAGNOSIS — K25.9 GASTRIC ULCER: ICD-10-CM

## 2023-04-04 DIAGNOSIS — K29.70 GASTRITIS: ICD-10-CM

## 2023-04-04 DIAGNOSIS — K29.70 GASTRITIS, UNSPECIFIED, WITHOUT BLEEDING: ICD-10-CM

## 2023-04-04 PROCEDURE — 88305 TISSUE EXAM BY PATHOLOGIST: CPT | Performed by: PATHOLOGY

## 2023-04-04 PROCEDURE — 43239 EGD BIOPSY SINGLE/MULTIPLE: CPT | Performed by: INTERNAL MEDICINE

## 2023-04-04 PROCEDURE — 88312 SPECIAL STAINS GROUP 1: CPT | Performed by: PATHOLOGY

## 2023-04-04 RX ORDER — FAMOTIDINE 40 MG/1
TAKE 1 TABLET BY MOUTH EVERY DAY TABLET, FILM COATED ORAL
Qty: 90 TABLET | Refills: 0 | Status: ACTIVE | COMMUNITY

## 2023-04-04 RX ORDER — DICYCLOMINE HYDROCHLORIDE 20 MG/1
TAKE 1 TABLET BY MOUTH FOUR TIMES A DAY AS NEEDED TABLET ORAL THREE TIMES A DAY
Qty: 90 | Refills: 3 | Status: ACTIVE | COMMUNITY
End: 2023-06-11

## 2023-04-04 RX ORDER — USTEKINUMAB 90 MG/ML
INJECTION, SOLUTION SUBCUTANEOUS
Qty: 1 | Refills: 0 | Status: ACTIVE | COMMUNITY

## 2023-04-04 RX ORDER — OMEPRAZOLE 40 MG/1
TAKE 1 CAPSULE BY MOUTH TWICE A DAY CAPSULE, DELAYED RELEASE ORAL ONCE A DAY
Qty: 30 | Refills: 3 | Status: ACTIVE | COMMUNITY

## 2023-05-02 ENCOUNTER — WEB ENCOUNTER (OUTPATIENT)
Dept: URBAN - METROPOLITAN AREA CLINIC 63 | Facility: CLINIC | Age: 38
End: 2023-05-02

## 2023-05-02 ENCOUNTER — TELEPHONE ENCOUNTER (OUTPATIENT)
Dept: URBAN - METROPOLITAN AREA CLINIC 64 | Facility: CLINIC | Age: 38
End: 2023-05-02

## 2023-05-02 ENCOUNTER — OFFICE VISIT (OUTPATIENT)
Dept: URBAN - METROPOLITAN AREA CLINIC 63 | Facility: CLINIC | Age: 38
End: 2023-05-02
Payer: COMMERCIAL

## 2023-05-02 VITALS
WEIGHT: 209.2 LBS | HEIGHT: 62 IN | DIASTOLIC BLOOD PRESSURE: 82 MMHG | HEART RATE: 68 BPM | TEMPERATURE: 98.1 F | BODY MASS INDEX: 38.5 KG/M2 | SYSTOLIC BLOOD PRESSURE: 124 MMHG | OXYGEN SATURATION: 98 %

## 2023-05-02 DIAGNOSIS — K50.00 CROHN'S DISEASE OF SMALL INTESTINE WITHOUT COMPLICATION: ICD-10-CM

## 2023-05-02 DIAGNOSIS — R10.13 EPIGASTRIC PAIN: ICD-10-CM

## 2023-05-02 PROCEDURE — 99214 OFFICE O/P EST MOD 30 MIN: CPT | Performed by: PHYSICIAN ASSISTANT

## 2023-05-02 RX ORDER — FAMOTIDINE 40 MG/1
TAKE 1 TABLET BY MOUTH EVERY DAY TABLET, FILM COATED ORAL ONCE A DAY
Qty: 90 | Refills: 3

## 2023-05-02 RX ORDER — USTEKINUMAB 90 MG/ML
IN J 1-90 SQ EVERY 6 WEEKS INJECTION, SOLUTION SUBCUTANEOUS
Qty: 1 | Refills: 5 | OUTPATIENT
Start: 2023-05-02 | End: 2024-01-08

## 2023-05-02 RX ORDER — OMEPRAZOLE 40 MG/1
TAKE 1 CAPSULE BY MOUTH TWICE A DAY CAPSULE, DELAYED RELEASE ORAL ONCE A DAY
Qty: 30 | Refills: 3 | Status: ACTIVE | COMMUNITY

## 2023-05-02 RX ORDER — USTEKINUMAB 90 MG/ML
INJECTION, SOLUTION SUBCUTANEOUS
Qty: 1 | Refills: 0 | Status: ACTIVE | COMMUNITY

## 2023-05-02 RX ORDER — FAMOTIDINE 40 MG/1
TAKE 1 TABLET BY MOUTH EVERY DAY TABLET, FILM COATED ORAL
Qty: 90 TABLET | Refills: 0 | Status: ACTIVE | COMMUNITY

## 2023-05-02 RX ORDER — DICYCLOMINE HYDROCHLORIDE 20 MG/1
TAKE 1 TABLET BY MOUTH FOUR TIMES A DAY AS NEEDED TABLET ORAL THREE TIMES A DAY
Qty: 90 | Refills: 3 | Status: ACTIVE | COMMUNITY
End: 2023-06-11

## 2023-05-02 NOTE — HPI-TODAY'S VISIT:
37-year-old female with Crohn's disease diagnosed on colonoscopy in May 2022 currently on Stelara 90 mg every 8 weeks presents for follow-up.  She was last seen in the office on March 13, 2023.  She had phoned into the office earlier in March reporting dark stools with mucus.  She also reported abdominal cramping.  Her labs including CBC, CMP, and CRP on March 7, 2023 were normal.  She followed up in the office reporting mucus and black spikes in the stool.  She reported a lot of fatigue.  She reported epigastric pain over 2 weeks and was having 3 episodes of diarrhea per day over 2 weeks.  She was treated with antibiotics in December for an upper respiratory infection.  She denied any hematochezia.  She denied any NSAID use.  Stool studies were done and were negative for pathogens including C. difficile.  Her fecal calprotectin was mildly elevated at 127.  She was started on omeprazole 40 mg daily.  EGD was done on April 4, 2023.  Her EGD demonstrated a normal-appearing esophagus.  2 cm hiatal hernia was present.  There was mild inflammation found in the gastric antrum.  Gastric biopsy negative for H pyloir. The duodenum appeared normal.  CT enterography was done on April 6, 2023 and showed mild inflammatory changes of the terminal ileum, associated congestion of the ileocecal mesentery and mild lymphadenopathy.  No evidence for complication such as strictures, obstruction, abscess or fistulas.  She follows up today doing well. She has no upper GI complaints on omeprazole 40mg daily and famotidine 40mg at night. Denies any noticeable blood in the stool. She usually has 3 mushy soft stools in the morning. She has occasional sharp pain in the RUQ of the abdomen. States this is not occurring frequently but when she is symptomatic, her symptoms will last all day. Her next dose of Stelara is due on 5/22/23.   She has a history of PUD on EGD in 2022 secondary to chronic NSAID use.  She was treated with high-dose PPI.  She was advised to avoid NSAIDs. Her colonoscopy in May 2022 showed multiple shallow ulcers in the terminal ileum and a diffuse area of congested and edematous mucosa.  The colonic mucosa appeared normal throughout.  Grade 1 internal hemorrhoids were observed.  She was started on Stelara in July 2022.  CT enterography done in June 2022 showed mild active inflammation involving portions of the distal and terminal ileum without evidence of complication.  Repeat colonoscopy was done in November 2022 and demonstrated mild to moderate inflammation in the terminal ileum improved from colonoscopy in May 2022.

## 2023-06-19 ENCOUNTER — TELEPHONE ENCOUNTER (OUTPATIENT)
Dept: URBAN - METROPOLITAN AREA CLINIC 63 | Facility: CLINIC | Age: 38
End: 2023-06-19

## 2023-06-19 ENCOUNTER — TELEPHONE ENCOUNTER (OUTPATIENT)
Dept: URBAN - METROPOLITAN AREA CLINIC 64 | Facility: CLINIC | Age: 38
End: 2023-06-19

## 2023-06-19 RX ORDER — DICYCLOMINE HYDROCHLORIDE 20 MG/1
TAKE 1 TABLET BY MOUTH FOUR TIMES A DAY AS NEEDED TABLET ORAL THREE TIMES A DAY
Qty: 90 | Refills: 3
End: 2023-10-17

## 2023-06-19 RX ORDER — USTEKINUMAB 90 MG/ML
IN J 1-90 SQ EVERY 6 WEEKS INJECTION, SOLUTION SUBCUTANEOUS
Qty: 1 | Refills: 5 | OUTPATIENT
Start: 2023-05-02 | End: 2024-02-25

## 2023-07-03 ENCOUNTER — OFFICE VISIT (OUTPATIENT)
Dept: URBAN - METROPOLITAN AREA CLINIC 63 | Facility: CLINIC | Age: 38
End: 2023-07-03

## 2023-07-03 RX ORDER — FAMOTIDINE 40 MG/1
TAKE 1 TABLET BY MOUTH EVERY DAY TABLET, FILM COATED ORAL ONCE A DAY
Qty: 90 | Refills: 3 | Status: ACTIVE | COMMUNITY

## 2023-07-03 RX ORDER — OMEPRAZOLE 40 MG/1
TAKE 1 CAPSULE BY MOUTH TWICE A DAY CAPSULE, DELAYED RELEASE ORAL ONCE A DAY
Qty: 30 | Refills: 3 | Status: ACTIVE | COMMUNITY

## 2023-07-03 RX ORDER — DICYCLOMINE HYDROCHLORIDE 20 MG/1
TAKE 1 TABLET BY MOUTH FOUR TIMES A DAY AS NEEDED TABLET ORAL THREE TIMES A DAY
Qty: 90 | Refills: 3 | Status: ACTIVE | COMMUNITY
End: 2023-10-17

## 2023-07-03 RX ORDER — USTEKINUMAB 90 MG/ML
IN J 1-90 SQ EVERY 6 WEEKS INJECTION, SOLUTION SUBCUTANEOUS
Qty: 1 | Refills: 5 | Status: ACTIVE | COMMUNITY
Start: 2023-05-02 | End: 2024-02-25

## 2023-07-03 RX ORDER — USTEKINUMAB 90 MG/ML
INJECTION, SOLUTION SUBCUTANEOUS
Qty: 1 | Refills: 0 | Status: ACTIVE | COMMUNITY

## 2023-07-03 NOTE — PHYSICAL EXAM EYES:
Conjuntivae and eyelids appear normal,  Sclerae : White without injection Talkative Increased goal directed activities, some grandiosity, paranoia, ideas of reference n/a

## 2023-07-03 NOTE — HPI-TODAY'S VISIT:
37-year-old female with Crohn's disease on Stelara every 6 weeks presents for follow-up. She was last seen in the office in May 2023 reporting occasional sharp pains in the right abdomen which were not occurring frequently but when she was symptomatic, her symptoms would last for the entire day.  She reported having 3 mushy soft stools in the morning.  She denied any noticeable blood in the stool.  She was not having any upper GI complaints on omeprazole 40 mg daily in the morning and famotidine 40 mg at night. CT enterography was done on April 6, 2023 which showed mild inflammatory changes of the terminal ileum, associated congestion of the ileocecal mesentery and mild lymphadenopathy.  No evidence for complication such as strictures, obstruction, abscess or fistulas. Due to persistent inflammation on imaging and symptoms, her Stelara was increased from every 8 weeks to every 6 weeks at her last office visit in May. Her labs including CBC, CMP, and CRP were normal on March 7, 2023. Colonoscopy November 11, 2022:Mild to moderate inflammation in the terminal ileum, improved from prior colonoscopy.  The ulcerations appeared to be healed and improved.  Biopsy of the terminal ileum showed chronic active ileitis with aphthous ulcer and pyloric gland metaplasia consistent with Crohn's disease.  The colonic mucosa appeared normal throughout.  A 5 mm inflammatory pseudopolyp was removed from the cecum.  Small internal hemorrhoids were observed. Colonoscopy May 13, 2022:Diffuse area of the terminal ileum congested and edematous.  The terminal ileum contained multiple shallow ulcers.  The colonic mucosa appeared normal throughout. EGD April 4, 2023:Mild, H. pylori negative gastritis.  2 cm hiatal hernia.  Normal esophagus.  Normal duodenum.

## 2023-08-01 ENCOUNTER — LAB OUTSIDE AN ENCOUNTER (OUTPATIENT)
Dept: URBAN - METROPOLITAN AREA CLINIC 63 | Facility: CLINIC | Age: 38
End: 2023-08-01

## 2023-09-18 ENCOUNTER — LAB OUTSIDE AN ENCOUNTER (OUTPATIENT)
Dept: URBAN - METROPOLITAN AREA CLINIC 63 | Facility: CLINIC | Age: 38
End: 2023-09-18

## 2023-09-18 ENCOUNTER — OFFICE VISIT (OUTPATIENT)
Dept: URBAN - METROPOLITAN AREA CLINIC 63 | Facility: CLINIC | Age: 38
End: 2023-09-18
Payer: COMMERCIAL

## 2023-09-18 ENCOUNTER — WEB ENCOUNTER (OUTPATIENT)
Dept: URBAN - METROPOLITAN AREA CLINIC 63 | Facility: CLINIC | Age: 38
End: 2023-09-18

## 2023-09-18 VITALS
HEART RATE: 74 BPM | DIASTOLIC BLOOD PRESSURE: 80 MMHG | WEIGHT: 227 LBS | OXYGEN SATURATION: 97 % | SYSTOLIC BLOOD PRESSURE: 120 MMHG | HEIGHT: 62 IN | TEMPERATURE: 97.8 F | BODY MASS INDEX: 41.77 KG/M2

## 2023-09-18 DIAGNOSIS — K59.01 SLOW TRANSIT CONSTIPATION: ICD-10-CM

## 2023-09-18 DIAGNOSIS — K50.00 CROHN'S DISEASE OF SMALL INTESTINE WITHOUT COMPLICATION: ICD-10-CM

## 2023-09-18 DIAGNOSIS — R12 HEARTBURN: ICD-10-CM

## 2023-09-18 DIAGNOSIS — R10.13 EPIGASTRIC PAIN: ICD-10-CM

## 2023-09-18 PROBLEM — 35298007: Status: ACTIVE | Noted: 2023-09-18

## 2023-09-18 PROCEDURE — 99214 OFFICE O/P EST MOD 30 MIN: CPT | Performed by: PHYSICIAN ASSISTANT

## 2023-09-18 RX ORDER — ESOMEPRAZOLE MAGNESIUM 40 MG/1
1 CAPSULE CAPSULE, DELAYED RELEASE ORAL TWICE A DAY
Qty: 60 CAPSULE | Refills: 3 | OUTPATIENT
Start: 2023-09-18

## 2023-09-18 RX ORDER — DICYCLOMINE HYDROCHLORIDE 20 MG/1
TAKE 1 TABLET BY MOUTH FOUR TIMES A DAY AS NEEDED TABLET ORAL THREE TIMES A DAY
Qty: 90 | Refills: 3 | Status: ACTIVE | COMMUNITY
End: 2023-10-17

## 2023-09-18 RX ORDER — OMEPRAZOLE 40 MG/1
TAKE 1 CAPSULE BY MOUTH TWICE A DAY CAPSULE, DELAYED RELEASE ORAL ONCE A DAY
Qty: 30 | Refills: 3 | Status: ACTIVE | COMMUNITY

## 2023-09-18 RX ORDER — USTEKINUMAB 90 MG/ML
INJECTION, SOLUTION SUBCUTANEOUS
Qty: 1 | Refills: 0 | Status: ACTIVE | COMMUNITY

## 2023-09-18 RX ORDER — USTEKINUMAB 90 MG/ML
IN J 1-90 SQ EVERY 6 WEEKS INJECTION, SOLUTION SUBCUTANEOUS
Qty: 1 | Refills: 5 | Status: ACTIVE | COMMUNITY
Start: 2023-05-02 | End: 2024-02-25

## 2023-09-18 RX ORDER — FAMOTIDINE 40 MG/1
TAKE 1 TABLET BY MOUTH EVERY DAY TABLET, FILM COATED ORAL ONCE A DAY
Qty: 90 | Refills: 3 | Status: ACTIVE | COMMUNITY

## 2023-09-18 NOTE — HPI-TODAY'S VISIT:
37-year-old female with Crohn's disease on Stelara every 6 weeks presents for follow-up.  She was last seen in the office in May 2023 reporting occasional sharp pains in the right abdomen which were not occurring frequently but when she was symptomatic, her symptoms would last for the entire day.  She reported having 3 mushy soft stools in the morning.  She denied any noticeable blood in the stool.  She was not having any upper GI complaints on omeprazole 40 mg daily in the morning and famotidine 40 mg at night.  CT enterography was done on April 6, 2023 which showed mild inflammatory changes of the terminal ileum, associated congestion of the ileocecal mesentery and mild lymphadenopathy.  No evidence for complication such as strictures, obstruction, abscess or fistulas. Due to persistent inflammation on imaging and symptoms, her Stelara was increased from every 8 weeks to every 6 weeks in June 2023.    Her labs including CBC, CMP, and CRP were normal on March 7, 2023.  Her last dose of Stelara was given on 8/28/23. She reports recent flare of hemorrhoids. She had had recent constipation. She has a BM every 2-3 days. She has noticed occasional bright red blood in the stool. Sometimes she has rectal pain when she has BM. She has frequent heartburn on high dose PPI and famotidine. She has been avoiding spicy foods. She is not having any nighttimes snacks. She eats dinner early in the evening. No complaints of abdominal pain.  Colonoscopy November 11, 2022:Mild to moderate inflammation in the terminal ileum, improved from prior colonoscopy.  The ulcerations appeared to be healed and improved.  Biopsy of the terminal ileum showed chronic active ileitis with aphthous ulcer and pyloric gland metaplasia consistent with Crohn's disease.  The colonic mucosa appeared normal throughout.  A 5 mm inflammatory pseudopolyp was removed from the cecum.  Small internal hemorrhoids were observed.  Colonoscopy May 13, 2022:Diffuse area of the terminal ileum congested and edematous.  The terminal ileum contained multiple shallow ulcers.  The colonic mucosa appeared normal throughout.  EGD April 4, 2023:Mild, H. pylori negative gastritis.  2 cm hiatal hernia.  Normal esophagus.  Normal duodenum.

## 2023-09-19 ENCOUNTER — TELEPHONE ENCOUNTER (OUTPATIENT)
Dept: URBAN - METROPOLITAN AREA CLINIC 63 | Facility: CLINIC | Age: 38
End: 2023-09-19

## 2023-09-19 PROBLEM — 235595009: Status: ACTIVE | Noted: 2023-09-19

## 2023-09-19 RX ORDER — FAMOTIDINE 40 MG/1
TAKE 1 TABLET BY MOUTH EVERY DAY TABLET, FILM COATED ORAL ONCE A DAY
Qty: 90 | Refills: 3 | Status: ACTIVE | COMMUNITY

## 2023-09-19 RX ORDER — DICYCLOMINE HYDROCHLORIDE 20 MG/1
TAKE 1 TABLET BY MOUTH FOUR TIMES A DAY AS NEEDED TABLET ORAL THREE TIMES A DAY
Qty: 90 | Refills: 3 | Status: ACTIVE | COMMUNITY
End: 2023-10-17

## 2023-09-19 RX ORDER — OMEPRAZOLE 40 MG/1
TAKE 1 CAPSULE BY MOUTH TWICE A DAY CAPSULE, DELAYED RELEASE ORAL ONCE A DAY
Qty: 30 | Refills: 3 | Status: ACTIVE | COMMUNITY

## 2023-09-19 RX ORDER — PANTOPRAZOLE SODIUM 40 MG/1
1 TABLET TABLET, DELAYED RELEASE ORAL TWICE A DAY
Qty: 60 TABLET | Refills: 3 | OUTPATIENT
Start: 2023-09-19

## 2023-09-19 RX ORDER — USTEKINUMAB 90 MG/ML
IN J 1-90 SQ EVERY 6 WEEKS INJECTION, SOLUTION SUBCUTANEOUS
Qty: 1 | Refills: 5 | Status: ACTIVE | COMMUNITY
Start: 2023-05-02 | End: 2024-02-25

## 2023-09-19 RX ORDER — USTEKINUMAB 90 MG/ML
INJECTION, SOLUTION SUBCUTANEOUS
Qty: 1 | Refills: 0 | Status: ACTIVE | COMMUNITY

## 2023-09-19 RX ORDER — ESOMEPRAZOLE MAGNESIUM 40 MG/1
1 CAPSULE CAPSULE, DELAYED RELEASE ORAL TWICE A DAY
Qty: 60 CAPSULE | Refills: 3 | Status: ACTIVE | COMMUNITY
Start: 2023-09-18

## 2023-09-27 ENCOUNTER — TELEPHONE ENCOUNTER (OUTPATIENT)
Dept: URBAN - METROPOLITAN AREA CLINIC 63 | Facility: CLINIC | Age: 38
End: 2023-09-27

## 2023-09-27 RX ORDER — OMEPRAZOLE 40 MG/1
TAKE 1 CAPSULE BY MOUTH TWICE A DAY CAPSULE, DELAYED RELEASE ORAL ONCE A DAY
Qty: 30 | Refills: 3

## 2023-10-02 ENCOUNTER — OFFICE VISIT (OUTPATIENT)
Dept: URBAN - METROPOLITAN AREA SURGERY CENTER 4 | Facility: SURGERY CENTER | Age: 38
End: 2023-10-02
Payer: COMMERCIAL

## 2023-10-02 ENCOUNTER — CLAIMS CREATED FROM THE CLAIM WINDOW (OUTPATIENT)
Dept: URBAN - METROPOLITAN AREA CLINIC 4 | Facility: CLINIC | Age: 38
End: 2023-10-02
Payer: COMMERCIAL

## 2023-10-02 ENCOUNTER — TELEPHONE ENCOUNTER (OUTPATIENT)
Dept: URBAN - METROPOLITAN AREA CLINIC 63 | Facility: CLINIC | Age: 38
End: 2023-10-02

## 2023-10-02 DIAGNOSIS — K64.0 FIRST DEGREE HEMORRHOIDS: ICD-10-CM

## 2023-10-02 DIAGNOSIS — K63.89 OTHER SPECIFIED DISEASES OF INTESTINE: ICD-10-CM

## 2023-10-02 DIAGNOSIS — K64.0 INTERNAL HEMORRHOIDS GRADE I: ICD-10-CM

## 2023-10-02 DIAGNOSIS — K50.00 CROHN'S DISEASE OF SMALL INTESTINE WITHOUT COMPLICATION: ICD-10-CM

## 2023-10-02 DIAGNOSIS — K12.0 APHTHOUS ULCERATION: ICD-10-CM

## 2023-10-02 DIAGNOSIS — R68.89 ERYTHEMATOUS MUCOSA: ICD-10-CM

## 2023-10-02 DIAGNOSIS — K62.89 OTHER SPECIFIED DISEASES OF ANUS AND RECTUM: ICD-10-CM

## 2023-10-02 PROCEDURE — 45380 COLONOSCOPY AND BIOPSY: CPT | Performed by: INTERNAL MEDICINE

## 2023-10-02 PROCEDURE — 00811 ANES LWR INTST NDSC NOS: CPT | Performed by: NURSE ANESTHETIST, CERTIFIED REGISTERED

## 2023-10-02 PROCEDURE — 88305 TISSUE EXAM BY PATHOLOGIST: CPT | Performed by: PATHOLOGY

## 2023-10-02 RX ORDER — OMEPRAZOLE 40 MG/1
TAKE 1 CAPSULE BY MOUTH TWICE A DAY CAPSULE, DELAYED RELEASE ORAL ONCE A DAY
Qty: 30 | Refills: 3 | Status: ACTIVE | COMMUNITY

## 2023-10-02 RX ORDER — DICYCLOMINE HYDROCHLORIDE 20 MG/1
TAKE 1 TABLET BY MOUTH FOUR TIMES A DAY AS NEEDED TABLET ORAL THREE TIMES A DAY
Qty: 90 | Refills: 3 | Status: ACTIVE | COMMUNITY
End: 2023-10-17

## 2023-10-02 RX ORDER — ESOMEPRAZOLE MAGNESIUM 40 MG/1
1 CAPSULE CAPSULE, DELAYED RELEASE ORAL TWICE A DAY
Qty: 60 CAPSULE | Refills: 3 | Status: ACTIVE | COMMUNITY
Start: 2023-09-18

## 2023-10-02 RX ORDER — PANTOPRAZOLE SODIUM 40 MG/1
1 TABLET TABLET, DELAYED RELEASE ORAL TWICE A DAY
Qty: 60 TABLET | Refills: 3 | Status: ACTIVE | COMMUNITY
Start: 2023-09-19

## 2023-10-02 RX ORDER — USTEKINUMAB 90 MG/ML
INJECTION, SOLUTION SUBCUTANEOUS
Qty: 1 | Refills: 0 | Status: ACTIVE | COMMUNITY

## 2023-10-02 RX ORDER — FAMOTIDINE 40 MG/1
TAKE 1 TABLET BY MOUTH EVERY DAY TABLET, FILM COATED ORAL ONCE A DAY
Qty: 90 | Refills: 3 | Status: ACTIVE | COMMUNITY

## 2023-10-02 RX ORDER — USTEKINUMAB 90 MG/ML
IN J 1-90 SQ EVERY 6 WEEKS INJECTION, SOLUTION SUBCUTANEOUS
Qty: 1 | Refills: 5 | Status: ACTIVE | COMMUNITY
Start: 2023-05-02 | End: 2024-02-25

## 2023-10-09 ENCOUNTER — LAB OUTSIDE AN ENCOUNTER (OUTPATIENT)
Dept: URBAN - METROPOLITAN AREA CLINIC 63 | Facility: CLINIC | Age: 38
End: 2023-10-09

## 2023-10-10 LAB
A/G RATIO: 1.6
ABSOLUTE BASOPHILS: 81
ABSOLUTE EOSINOPHILS: 341
ABSOLUTE LYMPHOCYTES: 2009
ABSOLUTE MONOCYTES: 608
ABSOLUTE NEUTROPHILS: 3162
ALBUMIN: 3.9
ALKALINE PHOSPHATASE: 52
ALT (SGPT): 11
AST (SGOT): 13
BASOPHILS: 1.3
BILIRUBIN, TOTAL: 0.3
BUN/CREATININE RATIO: (no result)
BUN: 15
C-REACTIVE PROTEIN, QUANT: 5.5
CALCIUM: 8.9
CARBON DIOXIDE, TOTAL: 24
CHLORIDE: 108
CLIENT EDUCATION TRACKING: (no result)
CREATININE: 0.63
EGFR: 117
EOSINOPHILS: 5.5
GLOBULIN, TOTAL: 2.5
GLUCOSE: 87
HEMATOCRIT: 39.3
HEMOGLOBIN: 13.1
LYMPHOCYTES: 32.4
MCH: 32
MCHC: 33.3
MCV: 96.1
MONOCYTES: 9.8
MPV: 8.8
NEUTROPHILS: 51
PLATELET COUNT: 370
POTASSIUM: 4.6
PROTEIN, TOTAL: 6.4
RDW: 11.9
RED BLOOD CELL COUNT: 4.09
SODIUM: 141
WHITE BLOOD CELL COUNT: 6.2

## 2023-10-11 ENCOUNTER — LAB OUTSIDE AN ENCOUNTER (OUTPATIENT)
Dept: URBAN - METROPOLITAN AREA CLINIC 63 | Facility: CLINIC | Age: 38
End: 2023-10-11

## 2023-10-14 LAB — CALPROTECTIN, FECAL: 244

## 2023-10-16 LAB
USTEKINUMAB AB, S: <10
USTEKINUMAB: 1.9

## 2023-10-19 ENCOUNTER — OFFICE VISIT (OUTPATIENT)
Dept: URBAN - METROPOLITAN AREA CLINIC 63 | Facility: CLINIC | Age: 38
End: 2023-10-19
Payer: COMMERCIAL

## 2023-10-19 VITALS
HEART RATE: 86 BPM | BODY MASS INDEX: 41.59 KG/M2 | SYSTOLIC BLOOD PRESSURE: 122 MMHG | TEMPERATURE: 98.5 F | WEIGHT: 226 LBS | DIASTOLIC BLOOD PRESSURE: 80 MMHG | OXYGEN SATURATION: 99 % | HEIGHT: 62 IN

## 2023-10-19 DIAGNOSIS — K59.01 SLOW TRANSIT CONSTIPATION: ICD-10-CM

## 2023-10-19 DIAGNOSIS — R12 HEARTBURN: ICD-10-CM

## 2023-10-19 DIAGNOSIS — K50.00 CROHN'S DISEASE OF SMALL INTESTINE WITHOUT COMPLICATION: ICD-10-CM

## 2023-10-19 PROCEDURE — 99214 OFFICE O/P EST MOD 30 MIN: CPT | Performed by: PHYSICIAN ASSISTANT

## 2023-10-19 RX ORDER — PANTOPRAZOLE SODIUM 40 MG/1
1 TABLET TABLET, DELAYED RELEASE ORAL TWICE A DAY
Qty: 60 TABLET | Refills: 3 | Status: ACTIVE | COMMUNITY
Start: 2023-09-19

## 2023-10-19 RX ORDER — USTEKINUMAB 90 MG/ML
IN J 1-90 SQ EVERY 6 WEEKS INJECTION, SOLUTION SUBCUTANEOUS
Qty: 1 | Refills: 5 | Status: ACTIVE | COMMUNITY
Start: 2023-05-02 | End: 2024-02-25

## 2023-10-19 RX ORDER — FAMOTIDINE 40 MG/1
TAKE 1 TABLET BY MOUTH EVERY DAY TABLET, FILM COATED ORAL ONCE A DAY
Qty: 90 | Refills: 3 | Status: ACTIVE | COMMUNITY

## 2023-10-19 RX ORDER — ESOMEPRAZOLE MAGNESIUM 40 MG/1
1 CAPSULE CAPSULE, DELAYED RELEASE ORAL TWICE A DAY
Qty: 60 CAPSULE | Refills: 3 | Status: ACTIVE | COMMUNITY
Start: 2023-09-18

## 2023-10-19 RX ORDER — ADALIMUMAB 40MG/0.8ML
0.8 ML KIT SUBCUTANEOUS
Qty: 1 | OUTPATIENT
Start: 2023-10-19 | End: 2023-11-17

## 2023-10-19 NOTE — HPI-TODAY'S VISIT:
38-year-old female with Crohn's disease currently on Stelara every 6 weeks presents for follow-up.  She was started on Stelara after colonoscopy in May 2022 which showed congested and edematous mucosa in the terminal ileum containing multiple shallow ulcers.  The colonic mucosa appeared normal throughout.  She had a repeat colonoscopy in November 2022 which showed mild to moderate inflammation in the terminal ileum which was improved since colonoscopy in May.  The ulcerations had healed.  The biopsy of the terminal ileum showed chronic active ileitis with aphthous ulcer and pyloric gland metaplasia.  The colonic mucosa appeared normal throughout.  A 5 mm inflammatory pseudopolyp was removed from the cecum. She continued to complain of intermittent abdominal pain and constipation.  She was utilizing MiraLAX with good control of constipation.  She has chronic GERD with intermittent breakthrough symptoms and is utilizing omeprazole 40 mg twice a day and famotidine at night. CT enterography was done April 6, 2023 which showed mild inflammatory changes of the terminal ileum, associated congestion of the ileocecal mesentery and mild lymphadenopathy.  No evidence of stricture, obstruction, abscess or fistulas. Her labs including CBC, CMP and CRP have been normal.  Her labs were last done on October 9, 2023.  Her fecal calprotectin was elevated to 244.  Her Stelara level was low at 1.9. She underwent colonoscopy on October 2, 2023.  Her colonoscopy demonstrated moderate inflammation characterized by erythema and aphthous ulcerations in the terminal ileum.  Biopsy of the terminal ileum showed no significant abnormality.  The colonic mucosa appeared normal throughout.  Normal mucosa was found in the rectum.  However, there was a subepithelial bulge in the rectum.  Biopsy of the overlying mucosa showed no significant abnormality.  Random right and left colon biopsies showed no significant abnormality.  She reported having a tampon in place at the time of the procedure. She follows up doing well. She is not having any bothersome GI symptoms today. GERD sympotoms well controlled with BID PPI and H2RA. Bowel habits regular with miralax as needed. Denies any blood in the stool. She continues to use Stelara every 6 weeks.

## 2023-10-20 ENCOUNTER — TELEPHONE ENCOUNTER (OUTPATIENT)
Dept: URBAN - METROPOLITAN AREA CLINIC 63 | Facility: CLINIC | Age: 38
End: 2023-10-20

## 2023-10-20 RX ORDER — ADALIMUMAB 40MG/0.4ML
INJECT ONE 40MG PEN ONCE EVERY OTHER WEEK KIT SUBCUTANEOUS
Qty: 2 | Refills: 5 | OUTPATIENT
Start: 2023-11-10 | End: 2024-04-26

## 2023-10-20 RX ORDER — ADALIMUMAB 80MG/0.8ML
ON DAY ONE INJECT TWO 80MG PENS THEN ON DAY 15 INJECT ONE 80 MG PEN KIT SUBCUTANEOUS
Qty: 3 | Refills: 0 | OUTPATIENT
Start: 2023-11-10 | End: 2023-11-25

## 2023-11-09 LAB
HEP B CORE AB, IGM: (no result)
HEPATITIS B SURFACE ANTIGEN: (no result)
QUANTIFERON-TB GOLD PLUS: (no result)

## 2023-12-06 ENCOUNTER — LAB OUTSIDE AN ENCOUNTER (OUTPATIENT)
Dept: URBAN - METROPOLITAN AREA CLINIC 63 | Facility: CLINIC | Age: 38
End: 2023-12-06

## 2023-12-14 ENCOUNTER — OFFICE VISIT (OUTPATIENT)
Dept: URBAN - METROPOLITAN AREA CLINIC 63 | Facility: CLINIC | Age: 38
End: 2023-12-14

## 2024-01-05 ENCOUNTER — TELEPHONE ENCOUNTER (OUTPATIENT)
Dept: URBAN - METROPOLITAN AREA CLINIC 63 | Facility: CLINIC | Age: 39
End: 2024-01-05

## 2024-01-05 RX ORDER — ADALIMUMAB 40MG/0.4ML
INJECT ONE 40MG PEN ONCE EVERY OTHER WEEK KIT SUBCUTANEOUS
Qty: 2 | Refills: 5
Start: 2023-11-10 | End: 2024-06-21

## 2024-01-16 ENCOUNTER — TELEPHONE ENCOUNTER (OUTPATIENT)
Dept: URBAN - METROPOLITAN AREA CLINIC 63 | Facility: CLINIC | Age: 39
End: 2024-01-16

## 2024-01-30 ENCOUNTER — OFFICE VISIT (OUTPATIENT)
Dept: URBAN - METROPOLITAN AREA CLINIC 63 | Facility: CLINIC | Age: 39
End: 2024-01-30

## 2024-01-30 RX ORDER — ESOMEPRAZOLE MAGNESIUM 40 MG/1
1 CAPSULE CAPSULE, DELAYED RELEASE ORAL TWICE A DAY
Qty: 60 CAPSULE | Refills: 3 | Status: ACTIVE | COMMUNITY
Start: 2023-09-18

## 2024-01-30 RX ORDER — ADALIMUMAB 40MG/0.4ML
INJECT ONE 40MG PEN ONCE EVERY OTHER WEEK KIT SUBCUTANEOUS
Qty: 2 | Refills: 5 | Status: ACTIVE | COMMUNITY
Start: 2023-11-10 | End: 2024-06-21

## 2024-01-30 RX ORDER — USTEKINUMAB 90 MG/ML
IN J 1-90 SQ EVERY 6 WEEKS INJECTION, SOLUTION SUBCUTANEOUS
Qty: 1 | Refills: 5 | Status: ACTIVE | COMMUNITY
Start: 2023-05-02 | End: 2024-02-25

## 2024-01-30 RX ORDER — PANTOPRAZOLE SODIUM 40 MG/1
1 TABLET TABLET, DELAYED RELEASE ORAL TWICE A DAY
Qty: 60 TABLET | Refills: 3 | Status: ACTIVE | COMMUNITY
Start: 2023-09-19

## 2024-01-30 RX ORDER — FAMOTIDINE 40 MG/1
TAKE 1 TABLET BY MOUTH EVERY DAY TABLET, FILM COATED ORAL ONCE A DAY
Qty: 90 | Refills: 3 | Status: ACTIVE | COMMUNITY

## 2024-02-05 LAB
A/G RATIO: 1.6
ALBUMIN: 3.9
ALKALINE PHOSPHATASE: 54
ALT (SGPT): 17
AST (SGOT): 18
BILIRUBIN, TOTAL: 0.3
BUN/CREATININE RATIO: (no result)
BUN: 9
CALCIUM: 8.9
CALPROTECTIN, FECAL: (no result)
CARBON DIOXIDE, TOTAL: 25
CHLORIDE: 109
CREATININE: 0.66
EGFR: 115
GLOBULIN, TOTAL: 2.4
GLUCOSE: 103
HEMATOCRIT: 41.7
HEMOGLOBIN: 14
MCH: 32.6
MCHC: 33.6
MCV: 97.2
MPV: 9.1
PLATELET COUNT: 422
POTASSIUM: 4.4
PROTEIN, TOTAL: 6.3
RDW: 13
RED BLOOD CELL COUNT: 4.29
SODIUM: 140
WHITE BLOOD CELL COUNT: 7.1

## 2024-03-05 ENCOUNTER — OV EP (OUTPATIENT)
Dept: URBAN - METROPOLITAN AREA CLINIC 63 | Facility: CLINIC | Age: 39
End: 2024-03-05
Payer: COMMERCIAL

## 2024-03-05 VITALS
OXYGEN SATURATION: 97 % | HEART RATE: 81 BPM | BODY MASS INDEX: 45.08 KG/M2 | HEIGHT: 62 IN | DIASTOLIC BLOOD PRESSURE: 78 MMHG | TEMPERATURE: 97.8 F | SYSTOLIC BLOOD PRESSURE: 115 MMHG | WEIGHT: 245 LBS

## 2024-03-05 DIAGNOSIS — R12 HEARTBURN: ICD-10-CM

## 2024-03-05 DIAGNOSIS — K50.00 CROHN'S DISEASE OF SMALL INTESTINE WITHOUT COMPLICATION: ICD-10-CM

## 2024-03-05 DIAGNOSIS — K59.01 SLOW TRANSIT CONSTIPATION: ICD-10-CM

## 2024-03-05 PROCEDURE — 99214 OFFICE O/P EST MOD 30 MIN: CPT | Performed by: PHYSICIAN ASSISTANT

## 2024-03-05 RX ORDER — ESOMEPRAZOLE MAGNESIUM 40 MG/1
1 CAPSULE CAPSULE, DELAYED RELEASE ORAL TWICE A DAY
Qty: 60 CAPSULE | Refills: 3 | Status: ACTIVE | COMMUNITY
Start: 2023-09-18

## 2024-03-05 RX ORDER — PANTOPRAZOLE 40 MG/1
TAKE 1 TABLET BY MOUTH TWICE DAILY TABLET, DELAYED RELEASE ORAL
Qty: 60 TABLET | Refills: 0 | Status: ACTIVE | COMMUNITY

## 2024-03-05 RX ORDER — FAMOTIDINE 40 MG/1
TAKE 1 TABLET BY MOUTH EVERY DAY TABLET, FILM COATED ORAL ONCE A DAY
Qty: 90 | Refills: 3 | Status: ACTIVE | COMMUNITY

## 2024-03-05 RX ORDER — ADALIMUMAB 40MG/0.4ML
INJECT ONE 40MG PEN ONCE EVERY OTHER WEEK KIT SUBCUTANEOUS
Qty: 2 | Refills: 5 | Status: ACTIVE | COMMUNITY
Start: 2023-11-10 | End: 2024-06-21

## 2024-03-05 NOTE — PHYSICAL EXAM HENT:
Head,  normocephalic,  atraumatic,  Face,  Face within normal limits,  Ears,  External ears within normal limits,  Nose/Nasopharynx,  External nose  normal appearance,  nares patent,  no nasal discharge,  Mouth and Throat,  Oral cavity appearance normal,  Lips,  Appearance normal  Additional Notes: Patient to use htc 2.5 to areas Detail Level: Simple Render Risk Assessment In Note?: no

## 2024-03-05 NOTE — HPI-TODAY'S VISIT:
38-year-old female with GERD, PUD, chronic constipation and Crohn's disease currently on Humira 40 mg every 2 weeks presents for follow-up.   She was initially started on Stelara after she was diagnosed with Crohn's disease on colonoscopy in May 2022.  She continued to have persistent symptoms in the form of abdominal pain and evidence of active disease on CT and subsequent colonoscopies.  Fecal calprotectin was elevated and her Stelara level was low in October 2023 and Stelara was changed to Humira 40 mg every 2 weeks in October 2023 which she started in November 2023.  She was last seen in the office on October 19, 2023. She was utilizing omeprazole 40 mg twice a day and famotidine 40 mg nightly with good control of her reflux. For constipation she was using MiraLAX as needed with good effect. CBC, CMP and CRP were normal in October 2023. Her fecal calprotectin was elevated to 244.  Labs were done January 25, 2024. Her CBC and CMP were unremarkable. Fecal calprotectin was normal at 19 on 2/2/2024.  She follows up today doing well. She continues to use Humira without any problems. Constipation is well controlled with miralax. She has no abodminal pain and is eating well. She is not having any GERD symptoms. She has had weight gain since she started on treatment for Crohn's disease. Her weight is up almost 20 pounds since her last visit in Nov 2023. She states her PCP prescribed tirzepatide for weight loss but it was not covered by her insurance.    Colonoscopy 10/2/2023:Moderate inflammation characterized by erythema and aphthous ulcerations in the terminal ileum.  Biopsy of the terminal ileum showed no significant abnormality.  The colonic mucosa appeared normal throughout.  Normal mucosa was found in the rectum.  However, there was a subepithelial bulge in the rectum.  Biopsy of the overlying mucosa showed no significant abnormality.  Random right and left colon biopsy showed no significant abnormality.  EGD April 4, 2023.  Mild inflammation in the gastric antrum.  Gastric antral biopsy was negative for H. pylori.  Approved from prior endoscopy.  Normal esophagus.  Normal duodenum.  CT enterography in April 6, 2023:Mild inflammatory changes of the terminal ileum, associated congestion of the ileocecal mesentery and mild lymphadenopathy.  No evidence of stricture, obstruction, abscess or fistulous.  Colonoscopy November 2022:Mild to moderate inflammation in the terminal ileum which was improved since colonoscopy in May 2022.  The ulcerations had healed.  The biopsy of the terminal ileum showed chronic active ileitis with aphthous ulcer and pyloric gland metaplasia.  The colonic mucosa appeared normal throughout.  A 5 mm inflammatory pseudopolyp was removed from the cecum.  Colonoscopy May 2022:Congested and edematous mucosa in the terminal ileum containing multiple shallow ulcers.  The colonic mucosa appeared normal throughout. EGD 5/13/2022:Normal esophagus.  Regular Z-line.  Severely erythematous mucosa with erosions in the gastric antrum, prepyloric region of the stomach and at the pylorus.  Several small shallow gastric ulcers were found in the gastric antrum and prepyloric region of the stomach.  Gastric biopsy was negative for H. pylori.  Mildly congested mucosa was found in the first portion of the duodenum.  The second portion of the duodenum was normal.  Duodenal biopsy was negative for sprue.

## 2024-05-22 ENCOUNTER — TELEPHONE ENCOUNTER (OUTPATIENT)
Dept: URBAN - METROPOLITAN AREA CLINIC 63 | Facility: CLINIC | Age: 39
End: 2024-05-22

## 2024-05-31 ENCOUNTER — TELEPHONE ENCOUNTER (OUTPATIENT)
Dept: URBAN - METROPOLITAN AREA CLINIC 7 | Facility: CLINIC | Age: 39
End: 2024-05-31

## 2024-06-05 ENCOUNTER — LAB OUTSIDE AN ENCOUNTER (OUTPATIENT)
Dept: URBAN - METROPOLITAN AREA CLINIC 63 | Facility: CLINIC | Age: 39
End: 2024-06-05

## 2024-06-06 ENCOUNTER — OFFICE VISIT (OUTPATIENT)
Dept: URBAN - METROPOLITAN AREA CLINIC 63 | Facility: CLINIC | Age: 39
End: 2024-06-06

## 2024-06-07 ENCOUNTER — ERX REFILL RESPONSE (OUTPATIENT)
Dept: URBAN - METROPOLITAN AREA CLINIC 63 | Facility: CLINIC | Age: 39
End: 2024-06-07

## 2024-06-07 RX ORDER — ADALIMUMAB 40MG/0.4ML
INJECT 40 MG (0.4 ML) UNDER THE SKIN EVERY OTHER WEEK (EVERY 14 DAYS) (DISCONTINUE STELARA) KIT SUBCUTANEOUS
Qty: 2 EACH | Refills: 5 | OUTPATIENT

## 2024-06-07 RX ORDER — ADALIMUMAB 40MG/0.4ML
INJECT ONE 40MG PEN ONCE EVERY OTHER WEEK KIT SUBCUTANEOUS
Qty: 2 | Refills: 5 | OUTPATIENT

## 2024-08-29 ENCOUNTER — OFFICE VISIT (OUTPATIENT)
Dept: URBAN - METROPOLITAN AREA CLINIC 63 | Facility: CLINIC | Age: 39
End: 2024-08-29
Payer: COMMERCIAL

## 2024-08-29 ENCOUNTER — DASHBOARD ENCOUNTERS (OUTPATIENT)
Age: 39
End: 2024-08-29

## 2024-08-29 VITALS
OXYGEN SATURATION: 97 % | TEMPERATURE: 98.5 F | HEART RATE: 75 BPM | SYSTOLIC BLOOD PRESSURE: 120 MMHG | HEIGHT: 62 IN | WEIGHT: 246 LBS | RESPIRATION RATE: 20 BRPM | DIASTOLIC BLOOD PRESSURE: 70 MMHG | BODY MASS INDEX: 45.27 KG/M2

## 2024-08-29 DIAGNOSIS — K50.00 CROHN'S DISEASE OF SMALL INTESTINE WITHOUT COMPLICATION: ICD-10-CM

## 2024-08-29 DIAGNOSIS — R12 HEARTBURN: ICD-10-CM

## 2024-08-29 DIAGNOSIS — K59.01 SLOW TRANSIT CONSTIPATION: ICD-10-CM

## 2024-08-29 PROCEDURE — 99214 OFFICE O/P EST MOD 30 MIN: CPT | Performed by: PHYSICIAN ASSISTANT

## 2024-08-29 RX ORDER — ADALIMUMAB 40MG/0.4ML
INJECT 40 MG (0.4 ML) UNDER THE SKIN EVERY OTHER WEEK (EVERY 14 DAYS) (DISCONTINUE STELARA) KIT SUBCUTANEOUS
Qty: 2 EACH | Refills: 5 | Status: ACTIVE | COMMUNITY

## 2024-08-29 RX ORDER — PANTOPRAZOLE 40 MG/1
TAKE 1 TABLET BY MOUTH TWICE DAILY TABLET, DELAYED RELEASE ORAL
Qty: 60 EACH | Refills: 0 | Status: ACTIVE | COMMUNITY

## 2024-08-29 RX ORDER — FAMOTIDINE 40 MG/1
TAKE 1 TABLET BY MOUTH EVERY DAY TABLET, FILM COATED ORAL ONCE A DAY
Qty: 90 | Refills: 3 | Status: ACTIVE | COMMUNITY

## 2024-08-29 NOTE — HPI-TODAY'S VISIT:
38-year-old female with GERD, PUD, chronic constipation, Crohn's disease currently on Humira 40 mg every 2 weeks presents for follow-up.  She was initially started on Stelara after having been diagnosed with Crohn's disease on colonoscopy in May 2022.  She continued to have persistent symptoms in the form of abdominal pain and evidence of active disease on CT and subsequent colonoscopies.  Fecal calprotectin was elevated and Stelara level was low in October 2023 and she was initiated on Humira 40 mg every 2 weeks at that time.  She was last seen in the office in March 2024 doing well.  She was using Humira without any problems.  She her constipation was well-controlled using MiraLAX.  She did not denied any abdominal pain.  She was not having any GERD symptoms.  She had gained about 20 pounds since her prior visit in November 2023 and her PCP prescribed tirzepatide for weight loss but it was not covered by her insurance.  She was advised to continue Humira.  For GERD she was advised to continue pantoprazole 40 mg twice daily and famotidine at bedtime.  Labs were done June 24, 2024 including CBC which was normal with hemoglobin 13.9 and platelet count 326.  CMP was unremarkable with normal liver profile.  CRP was not elevated at less than 3.  Fecal calprotectin was borderline at 70 (19).  She follows up today reporting occasional sharp RLQ pain which will occur when she is constipated. She states she mostly been having regular bowel habits but uses miralax when need with good effect. She has recent weight gain and plans to discuss weight loss medications again with her PCP. She has occasional blood on the tissue when wiping which occurs when she is constipated and straining. She has no diarrhea. No other GI complaints.   Colonoscopy 10/2/2023:Moderate inflammation characterized by erythema and aphthous ulcerations in the terminal ileum.  Biopsy of the terminal ileum showed no significant abnormality.  The colonic mucosa appeared normal throughout.  Normal mucosa was found in the rectum.  However, there was a subepithelial bulge in the rectum.  Biopsy of the overlying mucosa showed no significant abnormality.  Random right and left colon biopsy showed no significant abnormality. EGD April 4, 2023:Mild inflammation in the gastric antrum.  Gastric antral biopsy was negative for H. pylori.  Improved from prior endoscopy.  Normal esophagus.  Normal duodenum.  CT enterography April 6, 2023:Mild inflammatory changes of the terminal ileum, associated congestion of the ileocecal mesentery and mild lymphadenopathy.  No evidence of stricture, obstruction, abscess or fistula.

## 2024-11-29 ENCOUNTER — LAB OUTSIDE AN ENCOUNTER (OUTPATIENT)
Dept: URBAN - METROPOLITAN AREA CLINIC 63 | Facility: CLINIC | Age: 39
End: 2024-11-29

## 2024-12-02 ENCOUNTER — OFFICE VISIT (OUTPATIENT)
Dept: URBAN - METROPOLITAN AREA CLINIC 63 | Facility: CLINIC | Age: 39
End: 2024-12-02

## 2024-12-16 ENCOUNTER — ERX REFILL RESPONSE (OUTPATIENT)
Dept: URBAN - METROPOLITAN AREA CLINIC 63 | Facility: CLINIC | Age: 39
End: 2024-12-16

## 2024-12-16 RX ORDER — ADALIMUMAB 40MG/0.4ML
INJECT 40 MG (0.4 ML) UNDER THE SKIN EVERY OTHER WEEK (EVERY 14 DAYS) (DISCONTINUE STELARA) KIT SUBCUTANEOUS
Qty: 2 EACH | Refills: 0 | OUTPATIENT

## 2024-12-16 RX ORDER — ADALIMUMAB 40MG/0.4ML
INJECT 40 MG (0.4 ML) UNDER THE SKIN EVERY OTHER WEEK (EVERY 14 DAYS) (DISCONTINUE STELARA) KIT SUBCUTANEOUS
Qty: 2 EACH | Refills: 5 | OUTPATIENT

## 2024-12-17 NOTE — PHYSICAL EXAM NEUROLOGIC:
oriented to person, place and time , normal sensation , short and long term memory intact Continue Regimen: APPLY METRONIDAZOLE 0.75% TOPICAL CREAM TO AFFECTED AREAS ON THE FACE, THEN TACROLIMUS OINTMENT OVER THE CREAM. DO THIS TWICE A DAY UNTIL RASH IS FULLY RESOLVED  Detail Level: Zone Render In Strict Bullet Format?: No

## 2025-02-04 NOTE — HPI-TODAY'S VISIT:
38-year-old female with GERD, PUD, chronic constipation and Crohn's disease currently on Humira 40 mg every 2 weeks presents for follow-up.   She was initially started on Stelara after she was diagnosed with Crohn's disease on colonoscopy in May 2022.  She continued to have persistent symptoms in the form of abdominal pain and evidence of active disease on CT and subsequent colonoscopies.  Fecal calprotectin was elevated and her Stelara level was low in October 2023 and Stelara was changed to Humira 40 mg every 2 weeks in October 2023 which she started in November 2023.  She was last seen in the office on October 19, 2023. She was utilizing omeprazole 40 mg twice a day and famotidine 40 mg nightly with good control of her reflux. For constipation she was using MiraLAX as needed with good effect. CBC, CMP and CRP were normal in October 2023. Her fecal calprotectin was elevated to 244. Repeat labs were ordered including CBC, CMP and fecal calprotectin but results have not been received.  She follows up today doing well.   Colonoscopy 10/2/2023:Moderate inflammation characterized by erythema and aphthous ulcerations in the terminal ileum.  Biopsy of the terminal ileum showed no significant abnormality.  The colonic mucosa appeared normal throughout.  Normal mucosa was found in the rectum.  However, there was a subepithelial bulge in the rectum.  Biopsy of the overlying mucosa showed no significant abnormality.  Random right and left colon biopsy showed no significant abnormality.  EGD April 4, 2023.  Mild inflammation in the gastric antrum.  Gastric antral biopsy was negative for H. pylori.  Approved from prior endoscopy.  Normal esophagus.  Normal duodenum.  CT enterography in April 6, 2023:Mild inflammatory changes of the terminal ileum, associated congestion of the ileocecal mesentery and mild lymphadenopathy.  No evidence of stricture, obstruction, abscess or fistulous.  Colonoscopy November 2022:Mild to moderate inflammation in the terminal ileum which was improved since colonoscopy in May 2022.  The ulcerations had healed.  The biopsy of the terminal ileum showed chronic active ileitis with aphthous ulcer and pyloric gland metaplasia.  The colonic mucosa appeared normal throughout.  A 5 mm inflammatory pseudopolyp was removed from the cecum.  Colonoscopy May 2022:Congested and edematous mucosa in the terminal ileum containing multiple shallow ulcers.  The colonic mucosa appeared normal throughout. EGD 5/13/2022:Normal esophagus.  Regular Z-line.  Severely erythematous mucosa with erosions in the gastric antrum, prepyloric region of the stomach and at the pylorus.  Several small shallow gastric ulcers were found in the gastric antrum and prepyloric region of the stomach.  Gastric biopsy was negative for H. pylori.  Mildly congested mucosa was found in the first portion of the duodenum.  The second portion of the duodenum was normal.  Duodenal biopsy was negative for sprue. 90

## 2025-02-10 ENCOUNTER — ERX REFILL RESPONSE (OUTPATIENT)
Dept: URBAN - METROPOLITAN AREA CLINIC 63 | Facility: CLINIC | Age: 40
End: 2025-02-10

## 2025-02-10 RX ORDER — ADALIMUMAB 40MG/0.4ML
INJECT 40 MG (0.4 ML) UNDER THE SKIN EVERY OTHER WEEK (EVERY 14 DAYS) KIT SUBCUTANEOUS
Qty: 2 EACH | Refills: 0 | OUTPATIENT

## 2025-02-10 RX ORDER — ADALIMUMAB 40MG/0.4ML
INJECT 40 MG (0.4 ML) UNDER THE SKIN EVERY OTHER WEEK (EVERY 14 DAYS) (DISCONTINUE STELARA) KIT SUBCUTANEOUS
Qty: 2 EACH | Refills: 0 | OUTPATIENT

## 2025-02-12 ENCOUNTER — LAB OUTSIDE AN ENCOUNTER (OUTPATIENT)
Dept: URBAN - METROPOLITAN AREA CLINIC 63 | Facility: CLINIC | Age: 40
End: 2025-02-12

## 2025-02-24 LAB
ADALIMUMAB AB, IBD: (no result)
ADALIMUMAB LEVEL, IBD: (no result)
INTERPRETATION: (no result)

## 2025-03-10 ENCOUNTER — ERX REFILL RESPONSE (OUTPATIENT)
Dept: URBAN - METROPOLITAN AREA CLINIC 63 | Facility: CLINIC | Age: 40
End: 2025-03-10

## 2025-03-10 RX ORDER — ADALIMUMAB 40MG/0.4ML
INJECT 40 MG (0.4 ML) UNDER THE SKIN EVERY OTHER WEEK (EVERY 14 DAYS) KIT SUBCUTANEOUS
Qty: 2 EACH | Refills: 0 | OUTPATIENT

## 2025-04-08 ENCOUNTER — ERX REFILL RESPONSE (OUTPATIENT)
Dept: URBAN - METROPOLITAN AREA CLINIC 63 | Facility: CLINIC | Age: 40
End: 2025-04-08

## 2025-04-08 RX ORDER — ADALIMUMAB 40MG/0.4ML
INJECT 40 MG (0.4 ML) UNDER THE SKIN EVERY OTHER WEEK (EVERY 14 DAYS) KIT SUBCUTANEOUS
Qty: 2 EACH | Refills: 0 | OUTPATIENT

## 2025-05-05 ENCOUNTER — ERX REFILL RESPONSE (OUTPATIENT)
Dept: URBAN - METROPOLITAN AREA CLINIC 63 | Facility: CLINIC | Age: 40
End: 2025-05-05

## 2025-05-05 RX ORDER — ADALIMUMAB 40MG/0.4ML
INJECT 40 MG (0.4 ML) UNDER THE SKIN EVERY OTHER WEEK (EVERY 14 DAYS) KIT SUBCUTANEOUS
Qty: 2 EACH | Refills: 0 | OUTPATIENT

## 2025-06-02 ENCOUNTER — ERX REFILL RESPONSE (OUTPATIENT)
Dept: URBAN - METROPOLITAN AREA CLINIC 63 | Facility: CLINIC | Age: 40
End: 2025-06-02

## 2025-06-02 RX ORDER — ADALIMUMAB 40MG/0.4ML
INJECT 40 MG (0.4 ML) UNDER THE SKIN EVERY OTHER WEEK (EVERY 14 DAYS) KIT SUBCUTANEOUS
Qty: 2 EACH | Refills: 0 | OUTPATIENT

## 2025-07-01 ENCOUNTER — ERX REFILL RESPONSE (OUTPATIENT)
Dept: URBAN - METROPOLITAN AREA CLINIC 63 | Facility: CLINIC | Age: 40
End: 2025-07-01

## 2025-07-01 RX ORDER — ADALIMUMAB 40MG/0.4ML
INJECT 40 MG (0.4 ML) UNDER THE SKIN EVERY OTHER WEEK (EVERY 14 DAYS) KIT SUBCUTANEOUS
Qty: 2 EACH | Refills: 0 | OUTPATIENT

## 2025-08-26 ENCOUNTER — ERX REFILL RESPONSE (OUTPATIENT)
Dept: URBAN - METROPOLITAN AREA CLINIC 63 | Facility: CLINIC | Age: 40
End: 2025-08-26

## 2025-08-26 RX ORDER — ADALIMUMAB 40MG/0.4ML
INJECT 40 MG (0.4 ML) UNDER THE SKIN EVERY OTHER WEEK (EVERY 14 DAYS) KIT SUBCUTANEOUS
Qty: 2 EACH | Refills: 0 | OUTPATIENT